# Patient Record
Sex: MALE | Race: WHITE | Employment: OTHER | URBAN - METROPOLITAN AREA
[De-identification: names, ages, dates, MRNs, and addresses within clinical notes are randomized per-mention and may not be internally consistent; named-entity substitution may affect disease eponyms.]

---

## 2017-02-06 RX ORDER — LISINOPRIL 40 MG/1
40 TABLET ORAL
Qty: 90 TABLET | Refills: 1 | Status: SHIPPED | OUTPATIENT
Start: 2017-02-06 | End: 2017-08-04

## 2017-02-06 NOTE — TELEPHONE ENCOUNTER
From: Doroteo Betancur  To: Raquel Herrera MD  Sent: 2/6/2017 8:57 AM CST  Subject: Medication Renewal Request    Original authorizing provider: MD Doroteo Saini would like a refill of the following medications:  hydrochlorothiazide (Tyrell Alfaroe

## 2017-02-06 NOTE — TELEPHONE ENCOUNTER
Hypertension Medications Protocol Failed2/6 7:55 AM   CMP or BMP in past 12 months     Requesting lisinopril   LOV: 9/2016  RTC: 6-12m   Last Labs: 1/2016  Filled: 1/5/16 #90 with 4 refills    No future appointments. Pended if okay to refill.

## 2017-02-06 NOTE — TELEPHONE ENCOUNTER
From: Kane Galvez  To: Feng Pete MD  Sent: 2/3/2017 8:46 PM CST  Subject: Medication Renewal Request    Original authorizing provider: MD Kane Almodovar would like a refill of the following medications:  lisinopril (PRINIVIL,ZESTRIL)

## 2017-02-07 RX ORDER — HYDROCHLOROTHIAZIDE 12.5 MG/1
CAPSULE, GELATIN COATED ORAL
Qty: 90 CAPSULE | Refills: 1 | OUTPATIENT
Start: 2017-02-07

## 2017-02-07 RX ORDER — HYDROCHLOROTHIAZIDE 12.5 MG/1
CAPSULE, GELATIN COATED ORAL
Qty: 90 CAPSULE | Refills: 4 | Status: SHIPPED | OUTPATIENT
Start: 2017-02-07 | End: 2018-07-06

## 2017-02-07 NOTE — TELEPHONE ENCOUNTER
From: Maria Alejandra Sheppard  To: Miah Calvillo MD  Sent: 2/6/2017 6:29 PM CST  Subject: Medication Renewal Request    Original authorizing provider: MD Maria Alejandra Palm would like a refill of the following medications:  hydrochlorothiazide (Ragini Amen

## 2017-02-07 NOTE — TELEPHONE ENCOUNTER
Requesting HCTZ   LOV: 9/6/16  RTC: 6-12m   Last Labs: 1/7/16  Filled: 1/5/16 #90 with 4 refills    No future appointments. Pended if okay.      Hypertension Medications Protocol Failed2/7 7:33 AM   CMP or BMP in past 12 months

## 2017-02-07 NOTE — TELEPHONE ENCOUNTER
Hypertension Medications Protocol Failed2/6 1:40 PM   CMP or BMP in past 12 months        Requesting hydrochlorothiazide  LOV: 9/6/16   RTC: 6-12m  Last Labs: 1/7/16  Filled: 1/5/16 #90 with 4 refills    No future appointments.

## 2017-05-02 ENCOUNTER — OFFICE VISIT (OUTPATIENT)
Dept: FAMILY MEDICINE CLINIC | Facility: CLINIC | Age: 57
End: 2017-05-02

## 2017-05-02 VITALS
WEIGHT: 243 LBS | DIASTOLIC BLOOD PRESSURE: 79 MMHG | HEART RATE: 80 BPM | RESPIRATION RATE: 16 BRPM | HEIGHT: 70.5 IN | TEMPERATURE: 98 F | BODY MASS INDEX: 34.4 KG/M2 | SYSTOLIC BLOOD PRESSURE: 130 MMHG

## 2017-05-02 DIAGNOSIS — I10 ESSENTIAL HYPERTENSION: ICD-10-CM

## 2017-05-02 DIAGNOSIS — M17.11 ARTHRITIS OF RIGHT KNEE: ICD-10-CM

## 2017-05-02 DIAGNOSIS — M25.461 SWELLING OF RIGHT KNEE JOINT: ICD-10-CM

## 2017-05-02 DIAGNOSIS — M94.261 CHONDROMALACIA, KNEE, RIGHT: Primary | ICD-10-CM

## 2017-05-02 DIAGNOSIS — M17.12 ARTHRITIS OF LEFT KNEE: ICD-10-CM

## 2017-05-02 PROCEDURE — 20610 DRAIN/INJ JOINT/BURSA W/O US: CPT | Performed by: INTERNAL MEDICINE

## 2017-05-02 PROCEDURE — 36415 COLL VENOUS BLD VENIPUNCTURE: CPT | Performed by: INTERNAL MEDICINE

## 2017-05-02 PROCEDURE — 89060 EXAM SYNOVIAL FLUID CRYSTALS: CPT | Performed by: INTERNAL MEDICINE

## 2017-05-02 PROCEDURE — 89051 BODY FLUID CELL COUNT: CPT | Performed by: INTERNAL MEDICINE

## 2017-05-02 PROCEDURE — 99213 OFFICE O/P EST LOW 20 MIN: CPT | Performed by: INTERNAL MEDICINE

## 2017-05-02 RX ORDER — METHYLPREDNISOLONE ACETATE 40 MG/ML
40 INJECTION, SUSPENSION INTRA-ARTICULAR; INTRALESIONAL; INTRAMUSCULAR; SOFT TISSUE ONCE
Status: COMPLETED | OUTPATIENT
Start: 2017-05-02 | End: 2017-05-02

## 2017-05-02 RX ADMIN — METHYLPREDNISOLONE ACETATE 40 MG: 40 INJECTION, SUSPENSION INTRA-ARTICULAR; INTRALESIONAL; INTRAMUSCULAR; SOFT TISSUE at 15:15:00

## 2017-05-02 NOTE — PATIENT INSTRUCTIONS
Thank you for choosing Yoly Flores MD at [place] [address]  To Do: Clarice Isetienne  1. Ice and brace knee  2. Keep it wrapped/compressed  3.  See dr Clarissa Urbina in next 1 month      • Please signup for [facility] CHART, which is electronic access to your record if quality of life.     Referrals, and Radiology Information:    If your insurance requires a referral to a specialist, please allow 5 business days to process your referral request.    If Gerson Renteria MD orders a CT or MRI, it may take up to 10 business days

## 2017-05-02 NOTE — PROGRESS NOTES
[facility] Internal Medicine Office Note  Chief Complaint:   Patient presents with:  Knee Pain: rt knee/ injection   htn    HPI:   This is a 64year old male coming in for  HPI  r knee pain and swelling  Pt  C/o 1 week of severe r knee pain and s mmHg  Pulse 80  Temp(Src) 97.9 °F (36.6 °C) (Temporal)  Resp 16  Ht 70.5\"  Wt 243 lb  BMI 34.36 kg/m2 Estimated body mass index is 34.36 kg/(m^2) as calculated from the following:    Height as of this encounter: 70.5\".     Weight as of this encounter: 243 joint newly dx effusion from OA, drained about 20 cc  And refer to ortho  -     methylPREDNISolone acetate (DEPO-medrol) 40 MG/ML injection 40 mg; Inject 1 mL (40 mg total) into the muscle once. -     Cell CT/DIF & Crystal Synovial fld [E];  Future  - Swelling of right knee joint      Networked reference to record EAP   Depression Screening (PHQ-2/PHQ-9): Over the LAST 2 WEEKS   Little interest or pleasure in doing things (over the last two weeks)?: Not at all    Feeling down, depressed, or hopeless (ov

## 2017-05-03 ENCOUNTER — TELEPHONE (OUTPATIENT)
Dept: FAMILY MEDICINE CLINIC | Facility: CLINIC | Age: 57
End: 2017-05-03

## 2017-05-03 NOTE — TELEPHONE ENCOUNTER
Discussed patient's results with him. He is noticing flushing/redness to his face. Informed can be a result of the steroid injected. Keep hydrated. Patient verbalized understanding.

## 2017-05-09 RX ORDER — ATORVASTATIN CALCIUM 20 MG/1
20 TABLET, FILM COATED ORAL
Qty: 90 TABLET | Refills: 4 | Status: SHIPPED | OUTPATIENT
Start: 2017-05-09 | End: 2018-07-06

## 2017-05-09 NOTE — TELEPHONE ENCOUNTER
From: Dilip Reilly  To: Brandt Maldonado MD  Sent: 5/9/2017 2:26 PM CDT  Subject: Medication Renewal Request    Original authorizing provider: MD Dilip Zambrano would like a refill of the following medications:  Atorvastatin Calcium (LIPITOR

## 2017-05-09 NOTE — TELEPHONE ENCOUNTER
Cholesterol Medication Protocol Failed5/9 2:28 PM   ALT < 80    ALT resulted within past year    Lipid panel within past 6 months        Requesting Atorvastatin Calcium (LIPITOR) 20 MG Oral Tab   LOV: 5/2/17  RTC: none specified  Last Labs: 01/2016  Filled

## 2017-05-30 ENCOUNTER — HOSPITAL ENCOUNTER (OUTPATIENT)
Dept: MRI IMAGING | Age: 57
Discharge: HOME | End: 2017-05-30
Attending: ORTHOPAEDIC SURGERY
Payer: OTHER GOVERNMENT

## 2017-05-30 DIAGNOSIS — M94.261 CHONDROMALACIA, KNEE, RIGHT: ICD-10-CM

## 2017-05-30 DIAGNOSIS — M23.91 INTERNAL DERANGEMENT OF KNEE, RIGHT: ICD-10-CM

## 2017-05-30 DIAGNOSIS — M25.461 SWELLING OF RIGHT KNEE JOINT: ICD-10-CM

## 2017-05-30 PROCEDURE — 73721 MRI JNT OF LWR EXTRE W/O DYE: CPT | Performed by: ORTHOPAEDIC SURGERY

## 2017-06-07 ENCOUNTER — EKG ENCOUNTER (OUTPATIENT)
Dept: LAB | Age: 57
End: 2017-06-07
Attending: ORTHOPAEDIC SURGERY
Payer: OTHER GOVERNMENT

## 2017-06-07 ENCOUNTER — APPOINTMENT (OUTPATIENT)
Dept: LAB | Age: 57
End: 2017-06-07
Attending: ORTHOPAEDIC SURGERY
Payer: OTHER GOVERNMENT

## 2017-06-07 DIAGNOSIS — Z01.818 PREOP EXAMINATION: Primary | ICD-10-CM

## 2017-06-07 DIAGNOSIS — Z01.818 PREOP EXAMINATION: ICD-10-CM

## 2017-06-07 PROBLEM — S83.231D COMPLEX TEAR OF MEDIAL MENISCUS OF RIGHT KNEE AS CURRENT INJURY, SUBSEQUENT ENCOUNTER: Status: ACTIVE | Noted: 2017-06-07

## 2017-06-07 PROCEDURE — 93010 ELECTROCARDIOGRAM REPORT: CPT | Performed by: INTERNAL MEDICINE

## 2017-06-07 PROCEDURE — 36415 COLL VENOUS BLD VENIPUNCTURE: CPT

## 2017-06-07 PROCEDURE — 80048 BASIC METABOLIC PNL TOTAL CA: CPT

## 2017-06-07 PROCEDURE — 93005 ELECTROCARDIOGRAM TRACING: CPT

## 2017-06-21 ENCOUNTER — OFFICE VISIT (OUTPATIENT)
Dept: FAMILY MEDICINE CLINIC | Facility: CLINIC | Age: 57
End: 2017-06-21

## 2017-06-21 VITALS
BODY MASS INDEX: 34 KG/M2 | SYSTOLIC BLOOD PRESSURE: 118 MMHG | RESPIRATION RATE: 18 BRPM | DIASTOLIC BLOOD PRESSURE: 76 MMHG | WEIGHT: 239 LBS | OXYGEN SATURATION: 98 % | TEMPERATURE: 99 F | HEART RATE: 100 BPM

## 2017-06-21 DIAGNOSIS — J06.9 UPPER RESPIRATORY TRACT INFECTION, UNSPECIFIED TYPE: Primary | ICD-10-CM

## 2017-06-21 PROCEDURE — 99213 OFFICE O/P EST LOW 20 MIN: CPT | Performed by: PHYSICIAN ASSISTANT

## 2017-06-21 RX ORDER — FLUTICASONE PROPIONATE 50 MCG
2 SPRAY, SUSPENSION (ML) NASAL DAILY
Qty: 1 INHALER | Refills: 0 | Status: SHIPPED | OUTPATIENT
Start: 2017-06-21

## 2017-06-21 RX ORDER — AZITHROMYCIN 250 MG/1
TABLET, FILM COATED ORAL
Qty: 6 TABLET | Refills: 0 | Status: SHIPPED | OUTPATIENT
Start: 2017-06-21 | End: 2018-02-12 | Stop reason: ALTCHOICE

## 2017-06-21 NOTE — PATIENT INSTRUCTIONS
-Cool mist humidifier at night  -Warm tea with honey  -Mucinex  -Flonase  -Motrin for pain or fever  -Go to ER with any worsening symptoms      Adult Self-Care for Colds  Colds are caused by viruses. They can’t be cured with antibiotics.  However, you can r · As your appetite returns, you can resume your normal diet.  Ask your doctor whether there are any foods you should avoid.     When to seek medical care  When you first notice symptoms, ask your health care provider if antiviral medications are appropriate

## 2017-06-21 NOTE — PROGRESS NOTES
CHIEF COMPLAINT:   Patient presents with:  Nasal Congestion: 3 days. HPI:   Moniak Otto is a 64year old male who presents for URI sxs for  4 days.   Patient reports possible subjective fevers that broke, maxillary sinus pressure, nasal congestion-m • Esophageal reflux           Past Surgical History    KNEE ARTHROSCOPY  2010    Comment right knee meniscal tear    APPENDECTOMY      OTHER SURGICAL HISTORY      Comment lasix surgery           Smoking Status: Former Smoker                   Packs/Day: 0. Leah Kapoor is a 64year old male who presents with symptoms that are consistent with    ASSESSMENT:   Upper respiratory tract infection, unspecified type  (primary encounter diagnosis)    PLAN: Meds as below.   See patient Instructions    Meds & Refills · Gargle every 2 hours with 1/4 teaspoon of salt dissolved in 1/2 cup of warm water. Suck on throat lozenges and cough drops to moisten your throat. · Cough medicines are available but it is unclear how effective they actually are.   · Take acetaminophen o

## 2017-06-23 ENCOUNTER — TELEPHONE (OUTPATIENT)
Dept: FAMILY MEDICINE CLINIC | Facility: CLINIC | Age: 57
End: 2017-06-23

## 2017-06-23 NOTE — TELEPHONE ENCOUNTER
Pt sts that he has an upper resp inf and was seen on 6/21/17 and treated. Pt sts that he received a call from Dr Shelbie Castillo office stating that Dr Nikki Mark needed to call them to say that the pt is healthy enough for surgery on Monday 6/26/17.   Pt also wants t

## 2017-06-23 NOTE — TELEPHONE ENCOUNTER
Patient was never seen in our office for a preop physical.  I called Dr. Kellie Enamorado office to speak with them and verify. They transferred me to surgical scheduling at his office who said patient does not need a preop to get his knee scoped.   I called hood

## 2017-06-27 PROBLEM — Z47.89 ORTHOPEDIC AFTERCARE: Status: ACTIVE | Noted: 2017-06-27

## 2017-08-04 RX ORDER — LISINOPRIL 40 MG/1
TABLET ORAL
Qty: 90 TABLET | Refills: 1 | Status: SHIPPED | OUTPATIENT
Start: 2017-08-04 | End: 2018-02-05

## 2017-08-09 PROBLEM — Z98.890 STATUS POST ARTHROSCOPY OF RIGHT KNEE: Status: ACTIVE | Noted: 2017-08-09

## 2018-02-05 RX ORDER — LISINOPRIL 40 MG/1
TABLET ORAL
Qty: 90 TABLET | Refills: 0 | Status: SHIPPED | OUTPATIENT
Start: 2018-02-05 | End: 2018-07-06

## 2018-02-12 PROBLEM — M25.461 SWELLING OF RIGHT KNEE JOINT: Status: RESOLVED | Noted: 2017-05-02 | Resolved: 2018-02-12

## 2018-03-23 ENCOUNTER — APPOINTMENT (OUTPATIENT)
Dept: LAB | Age: 58
End: 2018-03-23
Attending: INTERNAL MEDICINE
Payer: COMMERCIAL

## 2018-03-26 ENCOUNTER — LAB ENCOUNTER (OUTPATIENT)
Dept: LAB | Age: 58
End: 2018-03-26
Attending: INTERNAL MEDICINE
Payer: COMMERCIAL

## 2018-03-26 DIAGNOSIS — Z12.5 PROSTATE CANCER SCREENING: ICD-10-CM

## 2018-03-26 DIAGNOSIS — Z00.00 ROUTINE GENERAL MEDICAL EXAMINATION AT A HEALTH CARE FACILITY: ICD-10-CM

## 2018-03-26 DIAGNOSIS — I70.90 ATHEROSCLEROSIS: ICD-10-CM

## 2018-03-26 DIAGNOSIS — E78.5 DYSLIPIDEMIA: ICD-10-CM

## 2018-03-26 LAB
ALBUMIN SERPL-MCNC: 4.1 G/DL (ref 3.5–4.8)
ALP LIVER SERPL-CCNC: 70 U/L (ref 45–117)
ALT SERPL-CCNC: 30 U/L (ref 17–63)
AST SERPL-CCNC: 16 U/L (ref 15–41)
BASOPHILS # BLD AUTO: 0.07 X10(3) UL (ref 0–0.1)
BASOPHILS NFR BLD AUTO: 0.7 %
BILIRUB SERPL-MCNC: 0.5 MG/DL (ref 0.1–2)
BUN BLD-MCNC: 20 MG/DL (ref 8–20)
CALCIUM BLD-MCNC: 9.3 MG/DL (ref 8.3–10.3)
CHLORIDE: 103 MMOL/L (ref 101–111)
CHOLEST SMN-MCNC: 151 MG/DL (ref ?–200)
CO2: 27 MMOL/L (ref 22–32)
CREAT BLD-MCNC: 1.09 MG/DL (ref 0.7–1.3)
EOSINOPHIL # BLD AUTO: 0.33 X10(3) UL (ref 0–0.3)
EOSINOPHIL NFR BLD AUTO: 3.4 %
ERYTHROCYTE [DISTWIDTH] IN BLOOD BY AUTOMATED COUNT: 12.8 % (ref 11.5–16)
EST. AVERAGE GLUCOSE BLD GHB EST-MCNC: 117 MG/DL (ref 68–126)
GLUCOSE BLD-MCNC: 120 MG/DL (ref 70–99)
HBA1C MFR BLD HPLC: 5.7 % (ref ?–5.7)
HCT VFR BLD AUTO: 43.2 % (ref 37–53)
HDLC SERPL-MCNC: 35 MG/DL (ref 45–?)
HDLC SERPL: 4.31 {RATIO} (ref ?–4.97)
HGB BLD-MCNC: 14.4 G/DL (ref 13–17)
IMMATURE GRANULOCYTE COUNT: 0.04 X10(3) UL (ref 0–1)
IMMATURE GRANULOCYTE RATIO %: 0.4 %
LDLC SERPL CALC-MCNC: 90 MG/DL (ref ?–130)
LYMPHOCYTES # BLD AUTO: 3.29 X10(3) UL (ref 0.9–4)
LYMPHOCYTES NFR BLD AUTO: 33.7 %
M PROTEIN MFR SERPL ELPH: 7.3 G/DL (ref 6.1–8.3)
MCH RBC QN AUTO: 30.7 PG (ref 27–33.2)
MCHC RBC AUTO-ENTMCNC: 33.3 G/DL (ref 31–37)
MCV RBC AUTO: 92.1 FL (ref 80–99)
MONOCYTES # BLD AUTO: 0.72 X10(3) UL (ref 0.1–1)
MONOCYTES NFR BLD AUTO: 7.4 %
NEUTROPHIL ABS PRELIM: 5.31 X10 (3) UL (ref 1.3–6.7)
NEUTROPHILS # BLD AUTO: 5.31 X10(3) UL (ref 1.3–6.7)
NEUTROPHILS NFR BLD AUTO: 54.4 %
NONHDLC SERPL-MCNC: 116 MG/DL (ref ?–130)
PLATELET # BLD AUTO: 276 10(3)UL (ref 150–450)
POTASSIUM SERPL-SCNC: 4 MMOL/L (ref 3.6–5.1)
PSA SERPL-MCNC: 1.47 NG/ML (ref 0.01–4)
RBC # BLD AUTO: 4.69 X10(6)UL (ref 4.3–5.7)
RED CELL DISTRIBUTION WIDTH-SD: 43.3 FL (ref 35.1–46.3)
SODIUM SERPL-SCNC: 138 MMOL/L (ref 136–144)
TRIGL SERPL-MCNC: 130 MG/DL (ref ?–150)
VLDLC SERPL CALC-MCNC: 26 MG/DL (ref 5–40)
WBC # BLD AUTO: 9.8 X10(3) UL (ref 4–13)

## 2018-03-26 PROCEDURE — 85025 COMPLETE CBC W/AUTO DIFF WBC: CPT

## 2018-03-26 PROCEDURE — 83036 HEMOGLOBIN GLYCOSYLATED A1C: CPT

## 2018-03-26 PROCEDURE — 80053 COMPREHEN METABOLIC PANEL: CPT

## 2018-03-26 PROCEDURE — 36415 COLL VENOUS BLD VENIPUNCTURE: CPT

## 2018-03-26 PROCEDURE — 80061 LIPID PANEL: CPT

## 2018-03-26 PROCEDURE — 84153 ASSAY OF PSA TOTAL: CPT

## 2018-05-04 ENCOUNTER — APPOINTMENT (OUTPATIENT)
Dept: ULTRASOUND IMAGING | Age: 58
End: 2018-05-04
Attending: PHYSICIAN ASSISTANT
Payer: OTHER GOVERNMENT

## 2018-05-04 ENCOUNTER — HOSPITAL ENCOUNTER (EMERGENCY)
Age: 58
Discharge: HOME | End: 2018-05-04
Attending: EMERGENCY MEDICINE
Payer: OTHER GOVERNMENT

## 2018-05-04 VITALS
WEIGHT: 235 LBS | TEMPERATURE: 98 F | OXYGEN SATURATION: 99 % | HEART RATE: 82 BPM | RESPIRATION RATE: 16 BRPM | DIASTOLIC BLOOD PRESSURE: 72 MMHG | BODY MASS INDEX: 33.64 KG/M2 | HEIGHT: 70 IN | SYSTOLIC BLOOD PRESSURE: 112 MMHG

## 2018-05-04 DIAGNOSIS — S96.811A RUPTURE OF RIGHT PLANTARIS TENDON, INITIAL ENCOUNTER: Primary | ICD-10-CM

## 2018-05-04 PROCEDURE — 99284 EMERGENCY DEPT VISIT MOD MDM: CPT

## 2018-05-04 PROCEDURE — 93971 EXTREMITY STUDY: CPT | Performed by: PHYSICIAN ASSISTANT

## 2018-05-04 RX ORDER — HYDROCODONE BITARTRATE AND ACETAMINOPHEN 5; 325 MG/1; MG/1
1 TABLET ORAL ONCE
Status: COMPLETED | OUTPATIENT
Start: 2018-05-04 | End: 2018-05-04

## 2018-05-04 RX ORDER — TRAMADOL HYDROCHLORIDE 50 MG/1
TABLET ORAL EVERY 4 HOURS PRN
Qty: 10 TABLET | Refills: 0 | Status: SHIPPED | OUTPATIENT
Start: 2018-05-04 | End: 2018-05-11

## 2018-05-04 NOTE — ED PROVIDER NOTES
Patient Seen in: [facility] In [place]    History   Patient presents with:  Deep Vein Thrombosis (cardiovascular)    Stated Complaint: right calf pain and injury    20-year-old  male with a history of arthritis, hypertension, %  O2 Device: None (Room air)    Current:/79   Pulse 114   Temp 98.2 °F (36.8 °C) (Temporal)   Resp 18   Ht 177.8 cm (5' 10\")   Wt 106.6 kg   SpO2 99%   BMI 33.72 kg/m²         Physical Exam   Constitutional: He appears well-developed and well-sander

## 2018-05-04 NOTE — ED INITIAL ASSESSMENT (HPI)
Patient states that he felt the back of his right calf tight yesterday, today was walking in yard and felt something snap and now having right calf pain,.

## 2018-05-04 NOTE — ED PROVIDER NOTES
I reviewed that chart and discussed the case.   I have examined the patient and noted patient is a 49-year-old male presents emergency room with a history of straining his right calf the other night when he was climbing up and down a ladder to fix the damag impression(s):  Acute right calf strain. Rule out plantaris tendon rupture right leg. I agree with the plan as noted.

## 2018-07-09 NOTE — TELEPHONE ENCOUNTER
Medication(s) to Refill:   Pending Prescriptions Disp Refills    hydrochlorothiazide 12.5 MG Oral Cap 90 capsule 4     Sig: TAKE 1 TABLET BY MOUTH DAILY. atorvastatin 20 MG Oral Tab 90 tablet 4     Sig: Take 1 tablet (20 mg total) by mouth once daily.

## 2018-07-09 NOTE — TELEPHONE ENCOUNTER
From: Senia Aguilar  Sent: 7/6/2018 1:28 PM CDT  Subject: Medication Renewal Request    Jesúsina Fox.  Chrissy Gunderson would like a refill of the following medications:     hydrochlorothiazide 12.5 MG Oral Cap Lizett Bowers MD]   Patient Comment: Mounika Reyes and I maria eugenia t

## 2018-07-10 RX ORDER — HYDROCHLOROTHIAZIDE 12.5 MG/1
CAPSULE, GELATIN COATED ORAL
Qty: 90 CAPSULE | Refills: 4 | Status: SHIPPED
Start: 2018-07-10 | End: 2018-08-08

## 2018-07-10 RX ORDER — HYDROCHLOROTHIAZIDE 12.5 MG/1
CAPSULE, GELATIN COATED ORAL
Qty: 90 CAPSULE | Refills: 4
Start: 2018-07-10 | End: 2018-07-10

## 2018-07-10 RX ORDER — ATORVASTATIN CALCIUM 20 MG/1
20 TABLET, FILM COATED ORAL
Qty: 90 TABLET | Refills: 4 | Status: SHIPPED
Start: 2018-07-10 | End: 2018-08-08

## 2018-07-10 RX ORDER — ATORVASTATIN CALCIUM 20 MG/1
20 TABLET, FILM COATED ORAL
Qty: 90 TABLET | Refills: 4
Start: 2018-07-10 | End: 2018-07-10

## 2018-07-10 RX ORDER — LISINOPRIL 40 MG/1
40 TABLET ORAL
Qty: 90 TABLET | Refills: 4
Start: 2018-07-10 | End: 2018-07-10

## 2018-07-10 RX ORDER — LISINOPRIL 40 MG/1
40 TABLET ORAL
Qty: 90 TABLET | Refills: 4 | Status: SHIPPED
Start: 2018-07-10 | End: 2018-08-08

## 2018-07-10 NOTE — TELEPHONE ENCOUNTER
Pending Prescriptions Disp Refills    hydrochlorothiazide 12.5 MG Oral Cap 90 capsule 4     Sig: TAKE 1 TABLET BY MOUTH DAILY. atorvastatin 20 MG Oral Tab 90 tablet 4     Sig: Take 1 tablet (20 mg total) by mouth once daily.       lisinopril 40 MG Or

## 2018-07-10 NOTE — TELEPHONE ENCOUNTER
Pt called checking on status of refills. Infromed rx was approved, but per epic it was not electronically sent to pharm, resent to pharmacy.

## 2018-08-09 RX ORDER — HYDROCHLOROTHIAZIDE 12.5 MG/1
CAPSULE, GELATIN COATED ORAL
Qty: 90 CAPSULE | Refills: 2 | Status: SHIPPED
Start: 2018-08-09 | End: 2019-08-30

## 2018-08-09 RX ORDER — ATORVASTATIN CALCIUM 20 MG/1
20 TABLET, FILM COATED ORAL
Qty: 90 TABLET | Refills: 2 | Status: SHIPPED
Start: 2018-08-09 | End: 2019-08-30

## 2018-08-09 RX ORDER — LISINOPRIL 40 MG/1
40 TABLET ORAL
Qty: 90 TABLET | Refills: 2 | Status: SHIPPED
Start: 2018-08-09 | End: 2019-08-30

## 2018-08-09 NOTE — TELEPHONE ENCOUNTER
From: Maria Alejandra Sheppard  Sent: 8/8/2018 4:36 PM CDT  Subject: Medication Renewal Request    Clinton Orozco.  Darby Sutton would like a refill of the following medications:     hydrochlorothiazide 12.5 MG Oral Cap [Rahat Aleman MD]     atorvastatin 20 MG Oral Tab [Rahat BAUER

## 2018-11-18 RX ORDER — ATORVASTATIN CALCIUM 20 MG/1
20 TABLET, FILM COATED ORAL
Qty: 90 TABLET | Refills: 2 | OUTPATIENT
Start: 2018-11-18

## 2018-11-18 RX ORDER — HYDROCHLOROTHIAZIDE 12.5 MG/1
CAPSULE, GELATIN COATED ORAL
Qty: 90 CAPSULE | Refills: 2 | OUTPATIENT
Start: 2018-11-18

## 2018-11-18 RX ORDER — LISINOPRIL 40 MG/1
40 TABLET ORAL
Qty: 90 TABLET | Refills: 2 | OUTPATIENT
Start: 2018-11-18

## 2018-11-18 NOTE — TELEPHONE ENCOUNTER
Medication(s) to Refill:   Requested Prescriptions     Pending Prescriptions Disp Refills   • hydrochlorothiazide 12.5 MG Oral Cap 90 capsule 2     Sig: TAKE 1 TABLET BY MOUTH DAILY.    • atorvastatin 20 MG Oral Tab 90 tablet 2     Sig: Take 1 tablet (20 mg

## 2019-08-29 RX ORDER — HYDROCHLOROTHIAZIDE 12.5 MG/1
CAPSULE, GELATIN COATED ORAL
Qty: 90 CAPSULE | Refills: 3 | OUTPATIENT
Start: 2019-08-29

## 2019-08-29 RX ORDER — ATORVASTATIN CALCIUM 20 MG/1
TABLET, FILM COATED ORAL
Qty: 90 TABLET | Refills: 3 | OUTPATIENT
Start: 2019-08-29

## 2019-08-29 RX ORDER — LISINOPRIL 40 MG/1
TABLET ORAL
Qty: 90 TABLET | Refills: 3 | OUTPATIENT
Start: 2019-08-29

## 2019-08-29 NOTE — TELEPHONE ENCOUNTER
Requested Prescriptions     Pending Prescriptions Disp Refills   • hydrochlorothiazide 12.5 MG Oral Cap [Pharmacy Med Name: HYDROCHLOROTHIAZIDE 12.5 MG CP] 90 capsule 3     Sig: TAKE 1 CAPSULE BY MOUTH DAILY   • LISINOPRIL 40 MG Oral Tab [Pharmacy Med Name

## 2019-09-01 RX ORDER — LISINOPRIL 40 MG/1
40 TABLET ORAL
Qty: 90 TABLET | Refills: 2 | OUTPATIENT
Start: 2019-09-01

## 2019-09-01 RX ORDER — ATORVASTATIN CALCIUM 20 MG/1
20 TABLET, FILM COATED ORAL
Qty: 90 TABLET | Refills: 2 | OUTPATIENT
Start: 2019-09-01

## 2019-09-01 RX ORDER — HYDROCHLOROTHIAZIDE 12.5 MG/1
CAPSULE, GELATIN COATED ORAL
Qty: 90 CAPSULE | Refills: 2 | OUTPATIENT
Start: 2019-09-01

## 2019-11-11 PROBLEM — R73.03 PREDIABETES: Status: ACTIVE | Noted: 2019-11-11

## 2019-11-11 PROBLEM — E88.81 METABOLIC SYNDROME: Status: ACTIVE | Noted: 2019-11-11

## 2019-11-11 PROBLEM — E88.810 METABOLIC SYNDROME: Status: ACTIVE | Noted: 2019-11-11

## 2023-08-19 NOTE — TELEPHONE ENCOUNTER
Requesting Lisinopril 40mg daily  LOV: 5/2/17  RTC: 6 months  Last Labs: 6/7/17  Filled: 2/6/17 #90 with 1 refills    Future Appointments  Date Time Provider Rolando Harris   8/9/2017 9:15 AM KAYLEIGH Morin
Stable

## (undated) NOTE — MR AVS SNAPSHOT
[address]  [address] [facility] [address] [phone]               Thank you for choosing us for your health care visit with CAROL Azevedo.   We are glad to serve you and happy to provide you with this summary of · Saline nasal sprays and decongestant tablets help open a stuffy nose. Antihistamines can also help, but they can cause side effects such as drowsiness and drying of the eyes, nose, and mouth.   Soothe a sore throat and cough  · Gargle every 2 hours with 1 Allergies as of Jun 21, 2017     No Known Allergies                Today's Vital Signs     BP Pulse Temp Weight          118/76 mmHg 100 98.7 °F (37.1 °C) (Oral) 239 lb           Current Medications          This list is accurate as of: 6/21/17  9:22 AM. Summaries. If you've been to the Emergency Department or your doctor's office, you can view your past visit information in DATY by going to Visits < Visit Summaries. DATY questions? Call [phone] for help.   DATY is NOT to be used for urge

## (undated) NOTE — ED AVS SNAPSHOT
Cobytrae Jones   MRN: [id number]    Department:  THE [facility] Emergency Department in [place]   Date of Visit:  5/4/2018           Disclosure     Insurance plans vary and the physician(s) referred by the ER may not be covered by your plan.  Please contact tell this physician (or your personal doctor if your instructions are to return to your personal doctor) about any new or lasting problems. The primary care or specialist physician will see patients referred from the [facility] Emergency Department.  Salvadore Skiff

## (undated) NOTE — MR AVS SNAPSHOT
[address] [place] [address] [address]  [address] [facility] Radha Quan  [phone]               Thank you for choosing us for your health care visit with Tristan Goldsmith MD.  We are glad to serve you and happy to provide you with t clinic staff will provide you with the phone number you should call to schedule your appointment.      If you are confident that your benefit plan will not require a referral or authorization, such as [place], please feel free to schedule your debbie •Cardiac Testing in ER building Building A second floor Cardiac Testing [phone] (For example: Holter Monitor, Cardiac Stress tests,Event Monitor, or 2D Echocardiograms)  •[facility] call [phone] usually in [address] [facility] in Clinic in We cannot refill your maintenance medications at a preventative wellness visit. To best provide you care, patients receiving maintenance medications need to be seen at least twice a year. .         Allergies as of May 02, 2017     No Known Allergies For medical emergencies, dial 911. Educational Information     Healthy Diet and Regular Exercise  [employer] for making healthy food choices  -   Enjoy your food, but eat less. Fully enjoy your food when eating.    Don’t